# Patient Record
Sex: FEMALE | Race: WHITE | HISPANIC OR LATINO | URBAN - METROPOLITAN AREA
[De-identification: names, ages, dates, MRNs, and addresses within clinical notes are randomized per-mention and may not be internally consistent; named-entity substitution may affect disease eponyms.]

---

## 2017-07-07 ENCOUNTER — EMERGENCY (EMERGENCY)
Facility: HOSPITAL | Age: 42
LOS: 1 days | Discharge: HOME | End: 2017-07-07
Attending: EMERGENCY MEDICINE | Admitting: EMERGENCY MEDICINE
Payer: COMMERCIAL

## 2017-07-07 VITALS
OXYGEN SATURATION: 100 % | DIASTOLIC BLOOD PRESSURE: 104 MMHG | SYSTOLIC BLOOD PRESSURE: 140 MMHG | HEART RATE: 77 BPM | RESPIRATION RATE: 16 BRPM

## 2017-07-07 LAB
ALBUMIN SERPL ELPH-MCNC: 4.3 G/DL — SIGNIFICANT CHANGE UP (ref 3.3–5)
ALP SERPL-CCNC: 62 U/L — SIGNIFICANT CHANGE UP (ref 40–120)
ALT FLD-CCNC: 17 U/L RC — SIGNIFICANT CHANGE UP (ref 10–45)
ANION GAP SERPL CALC-SCNC: 13 MMOL/L — SIGNIFICANT CHANGE UP (ref 5–17)
AST SERPL-CCNC: 18 U/L — SIGNIFICANT CHANGE UP (ref 10–40)
BASOPHILS # BLD AUTO: 0 K/UL — SIGNIFICANT CHANGE UP (ref 0–0.2)
BASOPHILS NFR BLD AUTO: 0.6 % — SIGNIFICANT CHANGE UP (ref 0–2)
BILIRUB SERPL-MCNC: 0.4 MG/DL — SIGNIFICANT CHANGE UP (ref 0.2–1.2)
BUN SERPL-MCNC: 9 MG/DL — SIGNIFICANT CHANGE UP (ref 7–23)
CALCIUM SERPL-MCNC: 9 MG/DL — SIGNIFICANT CHANGE UP (ref 8.4–10.5)
CHLORIDE SERPL-SCNC: 104 MMOL/L — SIGNIFICANT CHANGE UP (ref 96–108)
CK MB BLD-MCNC: 1.3 % — SIGNIFICANT CHANGE UP (ref 0–3.5)
CK MB CFR SERPL CALC: 1.8 NG/ML — SIGNIFICANT CHANGE UP (ref 0–3.8)
CK SERPL-CCNC: 139 U/L — SIGNIFICANT CHANGE UP (ref 25–170)
CO2 SERPL-SCNC: 24 MMOL/L — SIGNIFICANT CHANGE UP (ref 22–31)
CREAT SERPL-MCNC: 0.65 MG/DL — SIGNIFICANT CHANGE UP (ref 0.5–1.3)
EOSINOPHIL # BLD AUTO: 0.1 K/UL — SIGNIFICANT CHANGE UP (ref 0–0.5)
EOSINOPHIL NFR BLD AUTO: 2.2 % — SIGNIFICANT CHANGE UP (ref 0–6)
GLUCOSE SERPL-MCNC: 80 MG/DL — SIGNIFICANT CHANGE UP (ref 70–99)
HCT VFR BLD CALC: 39.5 % — SIGNIFICANT CHANGE UP (ref 34.5–45)
HGB BLD-MCNC: 13.2 G/DL — SIGNIFICANT CHANGE UP (ref 11.5–15.5)
LYMPHOCYTES # BLD AUTO: 1.8 K/UL — SIGNIFICANT CHANGE UP (ref 1–3.3)
LYMPHOCYTES # BLD AUTO: 27.4 % — SIGNIFICANT CHANGE UP (ref 13–44)
MCHC RBC-ENTMCNC: 32.5 PG — SIGNIFICANT CHANGE UP (ref 27–34)
MCHC RBC-ENTMCNC: 33.4 GM/DL — SIGNIFICANT CHANGE UP (ref 32–36)
MCV RBC AUTO: 97.3 FL — SIGNIFICANT CHANGE UP (ref 80–100)
MONOCYTES # BLD AUTO: 0.5 K/UL — SIGNIFICANT CHANGE UP (ref 0–0.9)
MONOCYTES NFR BLD AUTO: 7.9 % — SIGNIFICANT CHANGE UP (ref 2–14)
NEUTROPHILS # BLD AUTO: 4 K/UL — SIGNIFICANT CHANGE UP (ref 1.8–7.4)
NEUTROPHILS NFR BLD AUTO: 61.8 % — SIGNIFICANT CHANGE UP (ref 43–77)
PLATELET # BLD AUTO: 171 K/UL — SIGNIFICANT CHANGE UP (ref 150–400)
POTASSIUM SERPL-MCNC: 3.4 MMOL/L — LOW (ref 3.5–5.3)
POTASSIUM SERPL-SCNC: 3.4 MMOL/L — LOW (ref 3.5–5.3)
PROT SERPL-MCNC: 7.2 G/DL — SIGNIFICANT CHANGE UP (ref 6–8.3)
RBC # BLD: 4.06 M/UL — SIGNIFICANT CHANGE UP (ref 3.8–5.2)
RBC # FLD: 12.1 % — SIGNIFICANT CHANGE UP (ref 10.3–14.5)
SODIUM SERPL-SCNC: 141 MMOL/L — SIGNIFICANT CHANGE UP (ref 135–145)
TROPONIN T SERPL-MCNC: <0.01 NG/ML — SIGNIFICANT CHANGE UP (ref 0–0.06)
WBC # BLD: 6.5 K/UL — SIGNIFICANT CHANGE UP (ref 3.8–10.5)
WBC # FLD AUTO: 6.5 K/UL — SIGNIFICANT CHANGE UP (ref 3.8–10.5)

## 2017-07-07 PROCEDURE — 71020: CPT | Mod: 26

## 2017-07-07 PROCEDURE — 93010 ELECTROCARDIOGRAM REPORT: CPT

## 2017-07-07 PROCEDURE — 99285 EMERGENCY DEPT VISIT HI MDM: CPT | Mod: 25

## 2017-07-07 RX ORDER — ASPIRIN/CALCIUM CARB/MAGNESIUM 324 MG
325 TABLET ORAL ONCE
Qty: 0 | Refills: 0 | Status: COMPLETED | OUTPATIENT
Start: 2017-07-07 | End: 2017-07-07

## 2017-07-07 RX ORDER — AMLODIPINE BESYLATE 2.5 MG/1
0 TABLET ORAL
Qty: 0 | Refills: 0 | COMMUNITY

## 2017-07-07 RX ORDER — ASPIRIN/CALCIUM CARB/MAGNESIUM 325 MG
325 TABLET ORAL ONCE
Qty: 0 | Refills: 0 | Status: DISCONTINUED | OUTPATIENT
Start: 2017-07-07 | End: 2017-07-07

## 2017-07-07 RX ORDER — POTASSIUM CHLORIDE 20 MEQ
40 PACKET (EA) ORAL ONCE
Qty: 0 | Refills: 0 | Status: COMPLETED | OUTPATIENT
Start: 2017-07-07 | End: 2017-07-07

## 2017-07-07 RX ADMIN — Medication 40 MILLIEQUIVALENT(S): at 22:44

## 2017-07-07 RX ADMIN — Medication 325 MILLIGRAM(S): at 21:04

## 2017-07-07 NOTE — ED PROVIDER NOTE - OBJECTIVE STATEMENT
41 year old female, HTN, hyperlipidemia, asthma, who presents to the ED for palpitations for 2 days. Reports palpitations intermittent, worse with laying flat. Reports she has chest pressure, substernal, non-radiating, 0/10, no aggravating or relieving factors. She reports she has nausea without vomiting. Reports no shortness of breath at rest but dyspnea on exertion. Went to primary medical doctor yesterday was first diagnosed with HTN and started on amlodipine. 41 year old female, HTN, hyperlipidemia, asthma, who presents to the ED for palpitations for 2 days. Reports palpitations intermittent, worse with laying flat. Reports she has chest pressure, substernal, non-radiating, 0/10, no aggravating or relieving factors. She reports she has nausea without vomiting. Also reports pain under left rib. Reports no shortness of breath at rest but dyspnea on exertion. Went to primary medical doctor yesterday was first diagnosed with HTN and started on amlodipine. Had EKG but does not know results . No recent fevers, chills. No excessive caffeine. No drug use. Former smoker. no hormaonal meds. Daughter sick with coxsackie. No rashes for patient. No recent travel. No recent hospitalizations or surgeries.

## 2017-07-07 NOTE — ED PROVIDER NOTE - MEDICAL DECISION MAKING DETAILS
41 year old female, HTN, hyperlipidemia, asthma, who presents to the ED for palpitations for 2 days. Will evaluate for cardiac/electrolyte causes and reeval. Keep on cardiac monitor. 1-2 risk factors for ACS

## 2017-07-07 NOTE — ED PROVIDER NOTE - ATTENDING CONTRIBUTION TO CARE
I have seen and evaluated this patient with the resident.   I agree with the findings  unless other wise stated.  I have made appropriate changes in documentations where needed, After my face to face bedside evaluation, I am further  [age] year old female, HTN, hyperlipidemia, asthma, who presents to the ED for palpitations for 2 days. Will evaluate for cardiac/electrolyte causes and reeval. Keep on cardiac monitor. 1-2 risk factors for ACS

## 2017-07-07 NOTE — ED PROVIDER NOTE - PLAN OF CARE
1) Take Tylenol 325mg tablet, take 2 tablets every 6-8 hours as needed for pain   2) Follow up with your cardiologist if you cannot follow up in 1-2 days call clinic at [phone] for appointment  3) You were given a copy of your results, please show them to your doctor for review.   4) Return to the ED for worsening palpitations, chest pain, abdominal pain, nausea, vomiting, dizziness, lightheadedness, or if you have any other new, worsening, or concerning symptoms.

## 2017-07-07 NOTE — ED ADULT NURSE NOTE - OBJECTIVE STATEMENT
42 y/o female hx high cholesterol presents to the ED c/o palpitations x 1 week. Pt states recent diagnosis of HTN, started on BP medications x 1 mth. Over past week noticed "not feeling right," BP was elevated at PMD- was advised to return to office for blood work, EKG normal yesterday. Endorsing intermittent SOB, nausea, pounding in chest radiating to jaw, headache. Denies blurred vision, diaphoresis, vomiting, fever, chills, echo/stress test. A&Ox3, in no respiratory distress, CM applied, EKG completed, safety maintained with comfort measures applied.

## 2017-07-07 NOTE — ED PROVIDER NOTE - PROGRESS NOTE DETAILS
delta trop negative, patient will need follow up with cardiology in 1-2 days, likely needs holter monitor. patient feels improved. will d/c home. discussed results, strict return precautions ambulatory, comfortable, stable for discharge. Jake Jason MD (attending) patient also given rapid follow up form.

## 2017-07-07 NOTE — ED PROVIDER NOTE - CARE PLAN
Principal Discharge DX:	Palpitations  Instructions for follow-up, activity and diet:	1) Take Tylenol 325mg tablet, take 2 tablets every 6-8 hours as needed for pain   2) Follow up with your cardiologist if you cannot follow up in 1-2 days call clinic at [phone] for appointment  3) You were given a copy of your results, please show them to your doctor for review.   4) Return to the ED for worsening palpitations, chest pain, abdominal pain, nausea, vomiting, dizziness, lightheadedness, or if you have any other new, worsening, or concerning symptoms.  Secondary Diagnosis:	Hypokalemia Principal Discharge DX:	Palpitations  Instructions for follow-up, activity and diet:	1) Take Tylenol 325mg tablet, take 2 tablets every 6-8 hours as needed for pain   2) Follow up with your cardiologist if you cannot follow up in 1-2 days call clinic at [phone] for appointment  3) You were given a copy of your results, please show them to your doctor for review.   4) Return to the ED for worsening palpitations, chest pain, abdominal pain, nausea, vomiting, dizziness, lightheadedness, or if you have any other new, worsening, or concerning symptoms.  Secondary Diagnosis:	Hypokalemia Principal Discharge DX:	Palpitations  Instructions for follow-up, activity and diet:	1) Take Tylenol 325mg tablet, take 2 tablets every 6-8 hours as needed for pain   2) Follow up with your cardiologist if you cannot follow up in 1-2 days call clinic at [phone] for appointment  3) You were given a copy of your results, please show them to your doctor for review.   4) Return to the ED for worsening palpitations, chest pain, abdominal pain, nausea, vomiting, dizziness, lightheadedness, or if you have any other new, worsening, or concerning symptoms.  Secondary Diagnosis:	Hypokalemia Principal Discharge DX:	Palpitations  Instructions for follow-up, activity and diet:	1) Take Tylenol 325mg tablet, take 2 tablets every 6-8 hours as needed for pain   2) Follow up with your cardiologist if you cannot follow up in 1-2 days call clinic at [phone] for appointment  3) You were given a copy of your results, please show them to your doctor for review.   4) Return to the ED for worsening palpitations, chest pain, abdominal pain, nausea, vomiting, dizziness, lightheadedness, or if you have any other new, worsening, or concerning symptoms.  Secondary Diagnosis:	Hypokalemia

## 2017-07-08 VITALS
OXYGEN SATURATION: 98 % | HEART RATE: 78 BPM | RESPIRATION RATE: 18 BRPM | TEMPERATURE: 98 F | SYSTOLIC BLOOD PRESSURE: 140 MMHG | DIASTOLIC BLOOD PRESSURE: 77 MMHG

## 2017-07-08 LAB
CK MB BLD-MCNC: 1.3 % — SIGNIFICANT CHANGE UP (ref 0–3.5)
CK MB CFR SERPL CALC: 1.7 NG/ML — SIGNIFICANT CHANGE UP (ref 0–3.8)
CK SERPL-CCNC: 131 U/L — SIGNIFICANT CHANGE UP (ref 25–170)
TROPONIN T SERPL-MCNC: <0.01 NG/ML — SIGNIFICANT CHANGE UP (ref 0–0.06)
TSH SERPL-MCNC: 0.66 UIU/ML — SIGNIFICANT CHANGE UP (ref 0.27–4.2)

## 2017-07-08 PROCEDURE — 71046 X-RAY EXAM CHEST 2 VIEWS: CPT

## 2017-07-08 PROCEDURE — 84443 ASSAY THYROID STIM HORMONE: CPT

## 2017-07-08 PROCEDURE — 80053 COMPREHEN METABOLIC PANEL: CPT

## 2017-07-08 PROCEDURE — 84484 ASSAY OF TROPONIN QUANT: CPT

## 2017-07-08 PROCEDURE — 82550 ASSAY OF CK (CPK): CPT

## 2017-07-08 PROCEDURE — 85027 COMPLETE CBC AUTOMATED: CPT

## 2017-07-08 PROCEDURE — 93005 ELECTROCARDIOGRAM TRACING: CPT

## 2017-07-08 PROCEDURE — 82553 CREATINE MB FRACTION: CPT

## 2017-07-08 PROCEDURE — 99283 EMERGENCY DEPT VISIT LOW MDM: CPT | Mod: 25

## 2017-07-13 ENCOUNTER — NON-APPOINTMENT (OUTPATIENT)
Age: 42
End: 2017-07-13

## 2017-07-13 ENCOUNTER — APPOINTMENT (OUTPATIENT)
Dept: CARDIOLOGY | Facility: CLINIC | Age: 42
End: 2017-07-13

## 2017-07-13 VITALS
HEART RATE: 91 BPM | WEIGHT: 148 LBS | DIASTOLIC BLOOD PRESSURE: 83 MMHG | SYSTOLIC BLOOD PRESSURE: 126 MMHG | BODY MASS INDEX: 25.9 KG/M2 | HEIGHT: 63.39 IN | OXYGEN SATURATION: 98 %

## 2017-07-13 DIAGNOSIS — Z87.891 PERSONAL HISTORY OF NICOTINE DEPENDENCE: ICD-10-CM

## 2017-07-13 DIAGNOSIS — I10 ESSENTIAL (PRIMARY) HYPERTENSION: ICD-10-CM

## 2017-07-13 DIAGNOSIS — Z83.49 FAMILY HISTORY OF OTHER ENDOCRINE, NUTRITIONAL AND METABOLIC DISEASES: ICD-10-CM

## 2017-07-13 DIAGNOSIS — Z82.49 FAMILY HISTORY OF ISCHEMIC HEART DISEASE AND OTHER DISEASES OF THE CIRCULATORY SYSTEM: ICD-10-CM

## 2017-07-13 DIAGNOSIS — R00.2 PALPITATIONS: ICD-10-CM

## 2017-07-13 DIAGNOSIS — Z87.898 PERSONAL HISTORY OF OTHER SPECIFIED CONDITIONS: ICD-10-CM

## 2017-07-13 DIAGNOSIS — E78.5 HYPERLIPIDEMIA, UNSPECIFIED: ICD-10-CM

## 2017-07-13 DIAGNOSIS — Z86.19 PERSONAL HISTORY OF OTHER INFECTIOUS AND PARASITIC DISEASES: ICD-10-CM

## 2017-07-13 DIAGNOSIS — J45.909 UNSPECIFIED ASTHMA, UNCOMPLICATED: ICD-10-CM

## 2017-07-13 DIAGNOSIS — R06.02 SHORTNESS OF BREATH: ICD-10-CM

## 2017-07-19 ENCOUNTER — NON-APPOINTMENT (OUTPATIENT)
Age: 42
End: 2017-07-19

## 2017-07-28 ENCOUNTER — APPOINTMENT (OUTPATIENT)
Dept: CV DIAGNOSITCS | Facility: HOSPITAL | Age: 42
End: 2017-07-28

## 2017-07-31 ENCOUNTER — OUTPATIENT (OUTPATIENT)
Dept: OUTPATIENT SERVICES | Facility: HOSPITAL | Age: 42
LOS: 1 days | End: 2017-07-31
Payer: COMMERCIAL

## 2017-07-31 ENCOUNTER — APPOINTMENT (OUTPATIENT)
Dept: CV DIAGNOSITCS | Facility: HOSPITAL | Age: 42
End: 2017-07-31

## 2017-07-31 DIAGNOSIS — R00.2 PALPITATIONS: ICD-10-CM

## 2017-07-31 PROCEDURE — 93306 TTE W/DOPPLER COMPLETE: CPT | Mod: 26

## 2017-07-31 PROCEDURE — 93306 TTE W/DOPPLER COMPLETE: CPT

## 2017-08-10 ENCOUNTER — APPOINTMENT (OUTPATIENT)
Dept: CARDIOLOGY | Facility: CLINIC | Age: 42
End: 2017-08-10
Payer: COMMERCIAL

## 2017-08-10 VITALS — SYSTOLIC BLOOD PRESSURE: 114 MMHG | HEART RATE: 73 BPM | DIASTOLIC BLOOD PRESSURE: 78 MMHG

## 2017-08-10 PROCEDURE — 99215 OFFICE O/P EST HI 40 MIN: CPT

## 2017-08-14 RX ORDER — ALBUTEROL SULFATE 90 UG/1
108 (90 BASE) AEROSOL, METERED RESPIRATORY (INHALATION)
Qty: 8 | Refills: 0 | Status: ACTIVE | COMMUNITY
Start: 2017-03-17

## 2017-08-14 RX ORDER — AZITHROMYCIN 250 MG/1
250 TABLET, FILM COATED ORAL
Qty: 6 | Refills: 0 | Status: COMPLETED | COMMUNITY
Start: 2017-03-17

## 2017-08-14 RX ORDER — AMLODIPINE BESYLATE 5 MG/1
5 TABLET ORAL
Qty: 30 | Refills: 0 | Status: ACTIVE | COMMUNITY
Start: 2017-07-06

## 2017-08-14 RX ORDER — FLUCONAZOLE 150 MG/1
150 TABLET ORAL
Qty: 1 | Refills: 0 | Status: COMPLETED | COMMUNITY
Start: 2017-03-18

## 2017-08-14 RX ORDER — METHYLPREDNISOLONE 4 MG/1
4 TABLET ORAL
Qty: 21 | Refills: 0 | Status: COMPLETED | COMMUNITY
Start: 2017-03-18

## 2018-02-13 ENCOUNTER — APPOINTMENT (OUTPATIENT)
Dept: SURGERY | Facility: CLINIC | Age: 43
End: 2018-02-13

## 2019-12-13 ENCOUNTER — EMERGENCY (EMERGENCY)
Facility: HOSPITAL | Age: 44
LOS: 1 days | Discharge: HOME | End: 2019-12-13
Attending: STUDENT IN AN ORGANIZED HEALTH CARE EDUCATION/TRAINING PROGRAM
Payer: COMMERCIAL

## 2019-12-13 VITALS
HEART RATE: 88 BPM | HEIGHT: 64.5 IN | DIASTOLIC BLOOD PRESSURE: 86 MMHG | OXYGEN SATURATION: 100 % | TEMPERATURE: 98 F | SYSTOLIC BLOOD PRESSURE: 149 MMHG | RESPIRATION RATE: 18 BRPM | WEIGHT: 160.06 LBS

## 2019-12-13 VITALS
DIASTOLIC BLOOD PRESSURE: 93 MMHG | OXYGEN SATURATION: 100 % | TEMPERATURE: 98 F | SYSTOLIC BLOOD PRESSURE: 132 MMHG | RESPIRATION RATE: 18 BRPM | HEART RATE: 68 BPM

## 2019-12-13 PROBLEM — I10 ESSENTIAL (PRIMARY) HYPERTENSION: Chronic | Status: ACTIVE | Noted: 2017-07-07

## 2019-12-13 LAB
ALBUMIN SERPL ELPH-MCNC: 4.5 G/DL — SIGNIFICANT CHANGE UP (ref 3.3–5)
ALP SERPL-CCNC: 83 U/L — SIGNIFICANT CHANGE UP (ref 40–120)
ALT FLD-CCNC: 22 U/L — SIGNIFICANT CHANGE UP (ref 10–45)
ANION GAP SERPL CALC-SCNC: 10 MMOL/L — SIGNIFICANT CHANGE UP (ref 5–17)
AST SERPL-CCNC: 21 U/L — SIGNIFICANT CHANGE UP (ref 10–40)
BASOPHILS # BLD AUTO: 0.07 K/UL — SIGNIFICANT CHANGE UP (ref 0–0.2)
BASOPHILS NFR BLD AUTO: 1 % — SIGNIFICANT CHANGE UP (ref 0–2)
BILIRUB SERPL-MCNC: 0.2 MG/DL — SIGNIFICANT CHANGE UP (ref 0.2–1.2)
BUN SERPL-MCNC: 19 MG/DL — SIGNIFICANT CHANGE UP (ref 7–23)
CALCIUM SERPL-MCNC: 8.8 MG/DL — SIGNIFICANT CHANGE UP (ref 8.4–10.5)
CHLORIDE SERPL-SCNC: 103 MMOL/L — SIGNIFICANT CHANGE UP (ref 96–108)
CO2 SERPL-SCNC: 25 MMOL/L — SIGNIFICANT CHANGE UP (ref 22–31)
CREAT SERPL-MCNC: 0.67 MG/DL — SIGNIFICANT CHANGE UP (ref 0.5–1.3)
EOSINOPHIL # BLD AUTO: 0.36 K/UL — SIGNIFICANT CHANGE UP (ref 0–0.5)
EOSINOPHIL NFR BLD AUTO: 5 % — SIGNIFICANT CHANGE UP (ref 0–6)
GLUCOSE SERPL-MCNC: 133 MG/DL — HIGH (ref 70–99)
HCT VFR BLD CALC: 40.7 % — SIGNIFICANT CHANGE UP (ref 34.5–45)
HGB BLD-MCNC: 13.4 G/DL — SIGNIFICANT CHANGE UP (ref 11.5–15.5)
IMM GRANULOCYTES NFR BLD AUTO: 0.3 % — SIGNIFICANT CHANGE UP (ref 0–1.5)
LYMPHOCYTES # BLD AUTO: 2.45 K/UL — SIGNIFICANT CHANGE UP (ref 1–3.3)
LYMPHOCYTES # BLD AUTO: 33.7 % — SIGNIFICANT CHANGE UP (ref 13–44)
MCHC RBC-ENTMCNC: 31.3 PG — SIGNIFICANT CHANGE UP (ref 27–34)
MCHC RBC-ENTMCNC: 32.9 GM/DL — SIGNIFICANT CHANGE UP (ref 32–36)
MCV RBC AUTO: 95.1 FL — SIGNIFICANT CHANGE UP (ref 80–100)
MONOCYTES # BLD AUTO: 0.63 K/UL — SIGNIFICANT CHANGE UP (ref 0–0.9)
MONOCYTES NFR BLD AUTO: 8.7 % — SIGNIFICANT CHANGE UP (ref 2–14)
NEUTROPHILS # BLD AUTO: 3.74 K/UL — SIGNIFICANT CHANGE UP (ref 1.8–7.4)
NEUTROPHILS NFR BLD AUTO: 51.3 % — SIGNIFICANT CHANGE UP (ref 43–77)
NRBC # BLD: 0 /100 WBCS — SIGNIFICANT CHANGE UP (ref 0–0)
PLATELET # BLD AUTO: 252 K/UL — SIGNIFICANT CHANGE UP (ref 150–400)
POTASSIUM SERPL-MCNC: 3.5 MMOL/L — SIGNIFICANT CHANGE UP (ref 3.5–5.3)
POTASSIUM SERPL-SCNC: 3.5 MMOL/L — SIGNIFICANT CHANGE UP (ref 3.5–5.3)
PROT SERPL-MCNC: 7.4 G/DL — SIGNIFICANT CHANGE UP (ref 6–8.3)
RBC # BLD: 4.28 M/UL — SIGNIFICANT CHANGE UP (ref 3.8–5.2)
RBC # FLD: 13.3 % — SIGNIFICANT CHANGE UP (ref 10.3–14.5)
SODIUM SERPL-SCNC: 138 MMOL/L — SIGNIFICANT CHANGE UP (ref 135–145)
WBC # BLD: 7.27 K/UL — SIGNIFICANT CHANGE UP (ref 3.8–10.5)
WBC # FLD AUTO: 7.27 K/UL — SIGNIFICANT CHANGE UP (ref 3.8–10.5)

## 2019-12-13 PROCEDURE — 99284 EMERGENCY DEPT VISIT MOD MDM: CPT

## 2019-12-13 PROCEDURE — 70450 CT HEAD/BRAIN W/O DYE: CPT | Mod: 26

## 2019-12-13 PROCEDURE — 80053 COMPREHEN METABOLIC PANEL: CPT

## 2019-12-13 PROCEDURE — 85027 COMPLETE CBC AUTOMATED: CPT

## 2019-12-13 PROCEDURE — 96374 THER/PROPH/DIAG INJ IV PUSH: CPT

## 2019-12-13 PROCEDURE — 99284 EMERGENCY DEPT VISIT MOD MDM: CPT | Mod: 25

## 2019-12-13 PROCEDURE — 70450 CT HEAD/BRAIN W/O DYE: CPT

## 2019-12-13 RX ORDER — METOCLOPRAMIDE HCL 10 MG
10 TABLET ORAL ONCE
Refills: 0 | Status: COMPLETED | OUTPATIENT
Start: 2019-12-13 | End: 2019-12-13

## 2019-12-13 RX ORDER — SODIUM CHLORIDE 9 MG/ML
500 INJECTION INTRAMUSCULAR; INTRAVENOUS; SUBCUTANEOUS ONCE
Refills: 0 | Status: COMPLETED | OUTPATIENT
Start: 2019-12-13 | End: 2019-12-13

## 2019-12-13 RX ORDER — SODIUM CHLORIDE 9 MG/ML
500 INJECTION INTRAMUSCULAR; INTRAVENOUS; SUBCUTANEOUS ONCE
Refills: 0 | Status: DISCONTINUED | OUTPATIENT
Start: 2019-12-13 | End: 2019-12-17

## 2019-12-13 RX ORDER — ACETAMINOPHEN 500 MG
975 TABLET ORAL ONCE
Refills: 0 | Status: COMPLETED | OUTPATIENT
Start: 2019-12-13 | End: 2019-12-13

## 2019-12-13 RX ADMIN — SODIUM CHLORIDE 1200 MILLILITER(S): 9 INJECTION INTRAMUSCULAR; INTRAVENOUS; SUBCUTANEOUS at 22:18

## 2019-12-13 RX ADMIN — Medication 10 MILLIGRAM(S): at 22:16

## 2019-12-13 RX ADMIN — Medication 975 MILLIGRAM(S): at 22:16

## 2019-12-13 NOTE — ED PROVIDER NOTE - ATTENDING CONTRIBUTION TO CARE
44 F w/ hx of noncompliance (previously diagnosed HTN), hx of pituitary adenoma (monitored by MRI) presents to the ED w/ worsening headache and reports intermittent blackouts of vision, followed by primary care doctor. 44 F w/ hx of noncompliance (previously diagnosed HTN), hx of pituitary adenoma (monitored by MRI) presents to the ED w/ worsening headache and reports intermittent blackouts of vision, and double vision that is intermittent. Pt recently started seeing her PCP who recommended that the patient start her antihypertensive.   no neuro deficit currently. Neuro: 5/5 bilateral elbow flexion/extension, 5/5 bilateral wrist flexion/extension, 5/5 bilateral hand , 5/5 bilateral hip flexion/extension, 5/5 bilateral knee flexion/extension, 5/5 bilateral dorsiflexion/plantarflexion, intact sensation in the bilateral arms and legs to light touch, normal finger to nose bilaterally w/ no dysmetria, 2+ reflexes in lower extremities, EOMI, CN2-12 intact, pupils are 4 mm and reactive bilaterally  pt is concerned regarding elevated bp  unclear eitiology of symptoms, pt to have MRI on monday, will treat w/ migraine cocktail  pt had significant improvement in symptoms  unlikely SAH- not sudden onset nor worst headache of life   pt just reports persistent head pain that has been gradually worsening. +sinus pressure  likely sinusitis vs migraine headache +photophobia, phonophobia and mild nausea w/ no vomiting.

## 2019-12-13 NOTE — ED PROVIDER NOTE - CLINICAL SUMMARY MEDICAL DECISION MAKING FREE TEXT BOX
44F hx pituitary tumor s/p resection with neg MRI 2 yrs ago, htn non compliant with amlodipine for a year presents with 3 days of gradual onset mild to moderate throbbing HA, photophobia, phonophobia, and feeling faint with High BP. Highest BP has been sys 150. Pt was able to book an appt for MR Head for Monday. BP here  132/81 neuro exam non focal, ambulating without ataxic gait. Treat migraine like HA, pt now  taking her Amlodipine at

## 2019-12-13 NOTE — ED PROVIDER NOTE - OBJECTIVE STATEMENT
44F hx pituitary tumor s/p resection with neg MRI 2 yrs ago, htn non compliant with amlodipine for a year presents with 3 days of gradual onset mild to moderate throbbing HA, feeling faint with High BP. Highest BP has been sys 150. +blurry vision, photophobia and phonophobia, also worsened by stress at work (teacher). Patient denies chest pain, any facial droop, slurred speech, difficulty speaking or word finding, difficulty swallowing, focal weakness/numbess/tingling, LOC, difficulty walking

## 2019-12-13 NOTE — ED PROVIDER NOTE - NSFOLLOWUPINSTRUCTIONS_ED_ALL_ED_FT
1) You were seen in the ER headache. The patient has been informed of all concerning signs and symptoms to return to Emergency Department, the necessity to follow up with PMD/Clinic/follow up provided within 2-3 days was explained, and the patient reports understanding of above with capacity and insight. You can look at the discharge papers for some examples of specific signs and symptoms to look out for.  2) Please get your MRI on Monday.   3) Please take Tylenol 650mg every 4-6 hours as needed for pain.  4) Please return to ED for any new or worsening symptoms.

## 2019-12-13 NOTE — ED PROVIDER NOTE - CARE PLAN
Principal Discharge DX:	HA (headache)  Secondary Diagnosis:	Photophobia of both eyes  Secondary Diagnosis:	Blurry vision, bilateral

## 2019-12-13 NOTE — ED PROVIDER NOTE - PHYSICAL EXAMINATION
GEN: Well appearing, well nourished, in no apparent distress.  HEAD: NCAT  HEENT: PERRL, EOMI, no nystagmus, no facial droop, Airway patent, uvula midline, MMM, neck supple, no LAD, no JVD, no raccoon sign, no zaman sign, no Lutheran TTP  LUNG: CTAB, no adventitious sounds, no retractions, no nasal flaring  CV: RRR, no murmurs,   Abd: soft, NTND, no rebound or guarding, BS+ in all quadrants, no CVAT  MSK: WWP, Pulses 2+ in extremities, No edema, no visible deformities, strength 5/5 in all extremities, FROM, no midspine TTP  Neuro:  CN II-XII in tact. AAOx3, Ambulatory with stable gait. sensations in tact in all extremities, neg pronator drift, neg finger to nose,  neg romberg  Skin: Warm and dry, no evidence of rash  Psych: normal mood and affect

## 2019-12-13 NOTE — ED PROVIDER NOTE - PATIENT PORTAL LINK FT
You can access the FollowMyHealth Patient Portal offered by [facility] by registering at the following website: [email or web address]. By joining [facility]’s FollowMyHealth portal, you will also be able to view your health information using other applications (apps) compatible with our system.

## 2019-12-14 NOTE — ED ADULT NURSE NOTE - NSIMPLEMENTINTERV_GEN_ALL_ED
Implemented All Universal Safety Interventions:  [place] to call system. Call bell, personal items and telephone within reach. Instruct patient to call for assistance. Room bathroom lighting operational. Non-slip footwear when patient is off stretcher. Physically safe environment: no spills, clutter or unnecessary equipment. Stretcher in lowest position, wheels locked, appropriate side rails in place.

## 2019-12-14 NOTE — ED ADULT NURSE NOTE - OBJECTIVE STATEMENT
Patient is a 44 year old female complaining of headache. Arrived by walk-in. Patient has history of htn. Patient is A&O x4. Pt reports headache for a few days and feeling like she would pass out at work. Denies complaints of chest pain, sob, fevers, chills, n/v/d, burning urination, blood in urine, blood in stool. Abd is soft, non tender, non distended. Skin is warm and dry. Color is consistent with ethnicity. VSS/ NAD. Safety and comfort maintained. family at the bedside. Will continue to monitor.

## 2021-08-03 NOTE — ED ADULT NURSE NOTE - CAS ELECT INFOMATION PROVIDED
Patient calling to state she is almost out of these medications.  Please advise  [phone]   dr. wheatley/LINNETTE cruz

## 2021-11-24 PROBLEM — D49.7 NEOPLASM OF UNSPECIFIED BEHAVIOR OF ENDOCRINE GLANDS AND OTHER PARTS OF NERVOUS SYSTEM: Chronic | Status: ACTIVE | Noted: 2019-12-13

## 2021-12-28 ENCOUNTER — APPOINTMENT (OUTPATIENT)
Dept: GASTROENTEROLOGY | Facility: CLINIC | Age: 46
End: 2021-12-28
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
## 2022-11-17 NOTE — ED PROVIDER NOTE - CROS ED EYES ALL NEG
ID    Chart reviewed. Path pending. I will change to doxycycline plus cipro and plan to complete 7 days of antibiotics. I will sign off.     Amilcar Burnett MD - - -

## 2023-03-21 NOTE — ED ADULT NURSE NOTE - RESPIRATORY WDL
Documented in another telephone call.    Breathing spontaneous and unlabored. Breath sounds clear and equal bilaterally with regular rhythm.

## 2023-05-20 ENCOUNTER — EMERGENCY (EMERGENCY)
Facility: HOSPITAL | Age: 48
LOS: 1 days | Discharge: HOME | End: 2023-05-20
Attending: EMERGENCY MEDICINE
Payer: COMMERCIAL

## 2023-05-20 VITALS
WEIGHT: 169.98 LBS | TEMPERATURE: 98 F | HEIGHT: 64 IN | DIASTOLIC BLOOD PRESSURE: 70 MMHG | HEART RATE: 121 BPM | SYSTOLIC BLOOD PRESSURE: 108 MMHG | OXYGEN SATURATION: 99 % | RESPIRATION RATE: 18 BRPM

## 2023-05-20 LAB
ALBUMIN SERPL ELPH-MCNC: 4.3 G/DL — SIGNIFICANT CHANGE UP (ref 3.3–5)
ALP SERPL-CCNC: 95 U/L — SIGNIFICANT CHANGE UP (ref 40–120)
ALT FLD-CCNC: 32 U/L — SIGNIFICANT CHANGE UP (ref 10–45)
ANION GAP SERPL CALC-SCNC: 17 MMOL/L — SIGNIFICANT CHANGE UP (ref 5–17)
APPEARANCE UR: ABNORMAL
AST SERPL-CCNC: 28 U/L — SIGNIFICANT CHANGE UP (ref 10–40)
BACTERIA # UR AUTO: ABNORMAL
BASOPHILS # BLD AUTO: 0 K/UL — SIGNIFICANT CHANGE UP (ref 0–0.2)
BASOPHILS NFR BLD AUTO: 0 % — SIGNIFICANT CHANGE UP (ref 0–2)
BILIRUB DIRECT SERPL-MCNC: 0.1 MG/DL — SIGNIFICANT CHANGE UP (ref 0–0.3)
BILIRUB INDIRECT FLD-MCNC: 0.4 MG/DL — SIGNIFICANT CHANGE UP (ref 0.2–1)
BILIRUB SERPL-MCNC: 0.5 MG/DL — SIGNIFICANT CHANGE UP (ref 0.2–1.2)
BILIRUB UR-MCNC: NEGATIVE — SIGNIFICANT CHANGE UP
BUN SERPL-MCNC: 19 MG/DL — SIGNIFICANT CHANGE UP (ref 7–23)
CALCIUM SERPL-MCNC: 8.8 MG/DL — SIGNIFICANT CHANGE UP (ref 8.4–10.5)
CHLORIDE SERPL-SCNC: 104 MMOL/L — SIGNIFICANT CHANGE UP (ref 96–108)
CO2 SERPL-SCNC: 20 MMOL/L — LOW (ref 22–31)
COLOR SPEC: YELLOW — SIGNIFICANT CHANGE UP
CREAT SERPL-MCNC: 0.82 MG/DL — SIGNIFICANT CHANGE UP (ref 0.5–1.3)
DIFF PNL FLD: NEGATIVE — SIGNIFICANT CHANGE UP
EGFR: 88 ML/MIN/1.73M2 — SIGNIFICANT CHANGE UP
EOSINOPHIL # BLD AUTO: 0.08 K/UL — SIGNIFICANT CHANGE UP (ref 0–0.5)
EOSINOPHIL NFR BLD AUTO: 0.9 % — SIGNIFICANT CHANGE UP (ref 0–6)
EPI CELLS # UR: 15 /HPF — HIGH
GLUCOSE SERPL-MCNC: 124 MG/DL — HIGH (ref 70–99)
GLUCOSE UR QL: NEGATIVE — SIGNIFICANT CHANGE UP
HCG SERPL-ACNC: <2 MIU/ML — SIGNIFICANT CHANGE UP
HCT VFR BLD CALC: 43.1 % — SIGNIFICANT CHANGE UP (ref 34.5–45)
HGB BLD-MCNC: 14 G/DL — SIGNIFICANT CHANGE UP (ref 11.5–15.5)
HYALINE CASTS # UR AUTO: 2 /LPF — SIGNIFICANT CHANGE UP (ref 0–7)
KETONES UR-MCNC: NEGATIVE — SIGNIFICANT CHANGE UP
LEUKOCYTE ESTERASE UR-ACNC: NEGATIVE — SIGNIFICANT CHANGE UP
LIDOCAIN IGE QN: 23 U/L — SIGNIFICANT CHANGE UP (ref 7–60)
LYMPHOCYTES # BLD AUTO: 0.36 K/UL — LOW (ref 1–3.3)
LYMPHOCYTES # BLD AUTO: 4.3 % — LOW (ref 13–44)
MANUAL SMEAR VERIFICATION: SIGNIFICANT CHANGE UP
MCHC RBC-ENTMCNC: 30 PG — SIGNIFICANT CHANGE UP (ref 27–34)
MCHC RBC-ENTMCNC: 32.5 GM/DL — SIGNIFICANT CHANGE UP (ref 32–36)
MCV RBC AUTO: 92.5 FL — SIGNIFICANT CHANGE UP (ref 80–100)
MONOCYTES # BLD AUTO: 0.43 K/UL — SIGNIFICANT CHANGE UP (ref 0–0.9)
MONOCYTES NFR BLD AUTO: 5.2 % — SIGNIFICANT CHANGE UP (ref 2–14)
NEUTROPHILS # BLD AUTO: 7.49 K/UL — HIGH (ref 1.8–7.4)
NEUTROPHILS NFR BLD AUTO: 89.6 % — HIGH (ref 43–77)
NITRITE UR-MCNC: NEGATIVE — SIGNIFICANT CHANGE UP
PH UR: 6 — SIGNIFICANT CHANGE UP (ref 5–8)
PLAT MORPH BLD: NORMAL — SIGNIFICANT CHANGE UP
PLATELET # BLD AUTO: 202 K/UL — SIGNIFICANT CHANGE UP (ref 150–400)
POTASSIUM SERPL-MCNC: 3.2 MMOL/L — LOW (ref 3.5–5.3)
POTASSIUM SERPL-SCNC: 3.2 MMOL/L — LOW (ref 3.5–5.3)
PROT SERPL-MCNC: 7.2 G/DL — SIGNIFICANT CHANGE UP (ref 6–8.3)
PROT UR-MCNC: ABNORMAL
RBC # BLD: 4.66 M/UL — SIGNIFICANT CHANGE UP (ref 3.8–5.2)
RBC # FLD: 13.8 % — SIGNIFICANT CHANGE UP (ref 10.3–14.5)
RBC BLD AUTO: NORMAL — SIGNIFICANT CHANGE UP
RBC CASTS # UR COMP ASSIST: 7 /HPF — HIGH (ref 0–4)
SODIUM SERPL-SCNC: 141 MMOL/L — SIGNIFICANT CHANGE UP (ref 135–145)
SP GR SPEC: 1.03 — HIGH (ref 1.01–1.02)
UROBILINOGEN FLD QL: NEGATIVE — SIGNIFICANT CHANGE UP
WBC # BLD: 8.36 K/UL — SIGNIFICANT CHANGE UP (ref 3.8–10.5)
WBC # FLD AUTO: 8.36 K/UL — SIGNIFICANT CHANGE UP (ref 3.8–10.5)
WBC UR QL: 2 /HPF — SIGNIFICANT CHANGE UP (ref 0–5)

## 2023-05-20 PROCEDURE — 99285 EMERGENCY DEPT VISIT HI MDM: CPT

## 2023-05-20 PROCEDURE — 71045 X-RAY EXAM CHEST 1 VIEW: CPT | Mod: 26

## 2023-05-20 RX ORDER — ONDANSETRON 8 MG/1
4 TABLET, FILM COATED ORAL ONCE
Refills: 0 | Status: COMPLETED | OUTPATIENT
Start: 2023-05-20 | End: 2023-05-20

## 2023-05-20 RX ORDER — ACETAMINOPHEN 500 MG
1000 TABLET ORAL ONCE
Refills: 0 | Status: COMPLETED | OUTPATIENT
Start: 2023-05-20 | End: 2023-05-20

## 2023-05-20 RX ORDER — SODIUM CHLORIDE 9 MG/ML
1000 INJECTION INTRAMUSCULAR; INTRAVENOUS; SUBCUTANEOUS ONCE
Refills: 0 | Status: COMPLETED | OUTPATIENT
Start: 2023-05-20 | End: 2023-05-20

## 2023-05-20 RX ADMIN — Medication 1000 MILLIGRAM(S): at 22:59

## 2023-05-20 RX ADMIN — ONDANSETRON 4 MILLIGRAM(S): 8 TABLET, FILM COATED ORAL at 22:25

## 2023-05-20 RX ADMIN — Medication 400 MILLIGRAM(S): at 22:29

## 2023-05-20 RX ADMIN — SODIUM CHLORIDE 1000 MILLILITER(S): 9 INJECTION INTRAMUSCULAR; INTRAVENOUS; SUBCUTANEOUS at 22:29

## 2023-05-20 NOTE — ED PROVIDER NOTE - PHYSICAL EXAMINATION
PHYSICAL EXAM:  GEN: NAD   HEAD: Atraumatic  CARDIAC: tachycardia no appreciable murmur   RESPIRATORY: Breathing unlabored. Breath sounds clear and equal bilaterally.  ABDOMEN:  Soft, nondistended. epigastric and RUQ tenderness without rebound or guarding  NEUROLOGICAL: Alert and oriented, grossly nonfocal   SKIN: Skin warm and dry. No evidence of rashes or lesions.

## 2023-05-20 NOTE — ED ADULT NURSE NOTE - NSFALLUNIVINTERV_ED_ALL_ED
Bed/Stretcher in lowest position, wheels locked, appropriate side rails in place/Call bell, personal items and telephone in reach/Instruct patient to call for assistance before getting out of bed/chair/stretcher/Non-slip footwear applied when patient is off stretcher/[place] to call system/Physically safe environment - no spills, clutter or unnecessary equipment/Purposeful proactive rounding/Room/bathroom lighting operational, light cord in reach

## 2023-05-20 NOTE — ED ADULT NURSE NOTE - OBJECTIVE STATEMENT
Patient is a 46 y/o female presenting to the ED c/o abdominal pain. Pt reports acute onset of RUQ and epigastric pain after waking up this morning with associated nausea, chills, fatigue and subjective fever. Pt reports pain worsening after eating, denies vomiting. Pt is A&OX3, following commands, speaks coherently, sensory/motor function intact. Pt tachycardic denies chest pain, SOB, palpitations. Pt also denies back pain, dysuria, constipation, diarrhea.

## 2023-05-20 NOTE — ED ADULT NURSE NOTE - CHIEF COMPLAINT QUOTE
abdominal pain began this morning; went to urgent care; sent to Ed for further eval; pt reports chills/nausea/subjective fever.

## 2023-05-20 NOTE — ED PROVIDER NOTE - NSFOLLOWUPINSTRUCTIONS_ED_ALL_ED_FT
Your evaluated in the emergency department with abdominal pain.  An ultrasound was performed which showed no issues with the gallbladder or liver.  A CAT scan was performed which showed no acute pathology in the abdomen    Your urinalysis revealed a urinary tract infection–prescription has been sent to your pharmacy this medication must be taken 4 times per day for 5 days.    Please return to the emergency department if you have any new or worsening symptoms    Follow-up with your primary care physician.

## 2023-05-20 NOTE — ED PROVIDER NOTE - PROGRESS NOTE DETAILS
Wale- results of ct discussed with patient. Pt noting improved pain. UA with uti. Will dc with keflex

## 2023-05-20 NOTE — ED PROVIDER NOTE - PATIENT PORTAL LINK FT
You can access the FollowMyHealth Patient Portal offered by [facility] by registering at the following website: [email or web address]. By joining [facility]’s FollowMyHealth portal, you will also be able to view your health information using other applications (apps) compatible with our system.

## 2023-05-20 NOTE — ED ADULT NURSE NOTE - CAS ELECT INFOMATION PROVIDED
pt instructed to follow up with outpatient PCP, return to ED with worsening s/s. Pt verbalizes understanding/DC instructions

## 2023-05-20 NOTE — ED PROVIDER NOTE - CLINICAL SUMMARY MEDICAL DECISION MAKING FREE TEXT BOX
47-year-old female presenting with sudden onset abdominal pain that began this morning. PMH pituitary adenoma s/p resection, asthma. VS with tachycardia. exam with epigastric and ruq tenderness. Plan to cbc cmp lipase ua bili ekg ruq u/s pain control, ivf, zofran

## 2023-05-20 NOTE — ED PROVIDER NOTE - OBJECTIVE STATEMENT
47-year-old female presenting with sudden onset abdominal pain that began this morning.  Patient describes pain as sharp and localized to the upper abdomen associated with nausea.  Patient was seen in urgent care earlier today and sent to the emergency department for further evaluation–had urine studies which showed elevated urine bilirubin, and also negative viral swab.  Patient also endorsing dysuria and chills. denies vomiting, diarrhea, hematochezia, hematuria. Denies hx of any abdominal surgery.

## 2023-05-20 NOTE — ED PROVIDER NOTE - ATTENDING CONTRIBUTION TO CARE
Pt with epigastric pain since this AM no assoc vomiting nor fever.     PE: well appearing, no jaundice, nontender abdomen.    MDM: check cbc r/o anemia or leukocytosis, check bmp to r/o metabolic derangement and lyte imbalance, LFTs, u/s abdomen r/o cholecystitis, GI meds. Pt with epigastric pain since this AM no assoc vomiting nor fever.     PE: well appearing, no jaundice, mild epigastric td abdomen.  No rebound.    MDM: check cbc r/o anemia or leukocytosis, check bmp to r/o metabolic derangement and lyte imbalance, LFTs, u/s abdomen r/o cholecystitis, GI meds.  xray chest r/o free air/perforation.  EKG, troponin r/o ACS.

## 2023-05-20 NOTE — ED ADULT TRIAGE NOTE - CHIEF COMPLAINT QUOTE
abdominal pain began this morning; went to urgent care; sent to Ed for further eval; pt reports chills/nausea. abdominal pain began this morning; went to urgent care; sent to Ed for further eval; pt reports chills/nausea/subjective fever.

## 2023-05-21 VITALS
OXYGEN SATURATION: 98 % | DIASTOLIC BLOOD PRESSURE: 71 MMHG | HEART RATE: 67 BPM | RESPIRATION RATE: 14 BRPM | TEMPERATURE: 99 F | SYSTOLIC BLOOD PRESSURE: 106 MMHG

## 2023-05-21 PROCEDURE — 99285 EMERGENCY DEPT VISIT HI MDM: CPT | Mod: 25

## 2023-05-21 PROCEDURE — 71045 X-RAY EXAM CHEST 1 VIEW: CPT

## 2023-05-21 PROCEDURE — 74177 CT ABD & PELVIS W/CONTRAST: CPT | Mod: MA

## 2023-05-21 PROCEDURE — 96375 TX/PRO/DX INJ NEW DRUG ADDON: CPT

## 2023-05-21 PROCEDURE — 80053 COMPREHEN METABOLIC PANEL: CPT

## 2023-05-21 PROCEDURE — 96374 THER/PROPH/DIAG INJ IV PUSH: CPT | Mod: XU

## 2023-05-21 PROCEDURE — 85025 COMPLETE CBC W/AUTO DIFF WBC: CPT

## 2023-05-21 PROCEDURE — 74177 CT ABD & PELVIS W/CONTRAST: CPT | Mod: 26,MA

## 2023-05-21 PROCEDURE — 84484 ASSAY OF TROPONIN QUANT: CPT

## 2023-05-21 PROCEDURE — 76705 ECHO EXAM OF ABDOMEN: CPT

## 2023-05-21 PROCEDURE — 81001 URINALYSIS AUTO W/SCOPE: CPT

## 2023-05-21 PROCEDURE — 76705 ECHO EXAM OF ABDOMEN: CPT | Mod: 26

## 2023-05-21 PROCEDURE — 84702 CHORIONIC GONADOTROPIN TEST: CPT

## 2023-05-21 PROCEDURE — 93005 ELECTROCARDIOGRAM TRACING: CPT

## 2023-05-21 PROCEDURE — 83690 ASSAY OF LIPASE: CPT

## 2023-05-21 PROCEDURE — 82248 BILIRUBIN DIRECT: CPT

## 2023-05-21 PROCEDURE — 87086 URINE CULTURE/COLONY COUNT: CPT

## 2023-05-21 RX ORDER — CEPHALEXIN 500 MG
1 CAPSULE ORAL
Qty: 20 | Refills: 0
Start: 2023-05-21 | End: 2023-05-25

## 2023-05-21 RX ORDER — ACETAMINOPHEN 500 MG
975 TABLET ORAL ONCE
Refills: 0 | Status: COMPLETED | OUTPATIENT
Start: 2023-05-21 | End: 2023-05-21

## 2023-05-21 RX ADMIN — Medication 975 MILLIGRAM(S): at 05:18

## 2023-05-22 LAB
CULTURE RESULTS: SIGNIFICANT CHANGE UP
SPECIMEN SOURCE: SIGNIFICANT CHANGE UP

## 2024-07-27 ENCOUNTER — EMERGENCY (EMERGENCY)
Facility: HOSPITAL | Age: 49
LOS: 1 days | Discharge: HOME | End: 2024-07-27
Attending: EMERGENCY MEDICINE
Payer: COMMERCIAL

## 2024-07-27 VITALS
SYSTOLIC BLOOD PRESSURE: 122 MMHG | DIASTOLIC BLOOD PRESSURE: 74 MMHG | OXYGEN SATURATION: 99 % | RESPIRATION RATE: 18 BRPM | HEART RATE: 62 BPM | TEMPERATURE: 98 F

## 2024-07-27 VITALS
HEART RATE: 68 BPM | WEIGHT: 179.9 LBS | SYSTOLIC BLOOD PRESSURE: 121 MMHG | RESPIRATION RATE: 20 BRPM | TEMPERATURE: 98 F | HEIGHT: 64 IN | DIASTOLIC BLOOD PRESSURE: 82 MMHG | OXYGEN SATURATION: 100 %

## 2024-07-27 PROCEDURE — 99284 EMERGENCY DEPT VISIT MOD MDM: CPT | Mod: 57

## 2024-07-27 PROCEDURE — 73630 X-RAY EXAM OF FOOT: CPT | Mod: 26,LT

## 2024-07-27 PROCEDURE — 29515 APPLICATION SHORT LEG SPLINT: CPT | Mod: LT

## 2024-07-27 PROCEDURE — 73090 X-RAY EXAM OF FOREARM: CPT | Mod: 26,LT

## 2024-07-27 PROCEDURE — 73630 X-RAY EXAM OF FOOT: CPT

## 2024-07-27 PROCEDURE — 73590 X-RAY EXAM OF LOWER LEG: CPT | Mod: 26,LT

## 2024-07-27 PROCEDURE — 73610 X-RAY EXAM OF ANKLE: CPT | Mod: 26,LT

## 2024-07-27 PROCEDURE — 73110 X-RAY EXAM OF WRIST: CPT

## 2024-07-27 PROCEDURE — 73110 X-RAY EXAM OF WRIST: CPT | Mod: 26,LT

## 2024-07-27 PROCEDURE — 73590 X-RAY EXAM OF LOWER LEG: CPT

## 2024-07-27 PROCEDURE — 27786 TREATMENT OF ANKLE FRACTURE: CPT | Mod: 54,LT

## 2024-07-27 PROCEDURE — 73080 X-RAY EXAM OF ELBOW: CPT | Mod: 26,LT

## 2024-07-27 PROCEDURE — 99284 EMERGENCY DEPT VISIT MOD MDM: CPT | Mod: 25

## 2024-07-27 PROCEDURE — 73080 X-RAY EXAM OF ELBOW: CPT

## 2024-07-27 PROCEDURE — 73090 X-RAY EXAM OF FOREARM: CPT

## 2024-07-27 PROCEDURE — 73610 X-RAY EXAM OF ANKLE: CPT

## 2024-07-27 RX ORDER — ACETAMINOPHEN 325 MG
975 TABLET ORAL ONCE
Refills: 0 | Status: COMPLETED | OUTPATIENT
Start: 2024-07-27 | End: 2024-07-27

## 2024-07-27 RX ADMIN — Medication 400 MILLIGRAM(S): at 11:01

## 2024-07-27 RX ADMIN — Medication 975 MILLIGRAM(S): at 11:01

## 2024-07-27 NOTE — ED PROVIDER NOTE - PATIENT PORTAL LINK FT
You can access the FollowMyHealth Patient Portal offered by [facility] by registering at the following website: [email or web address]. By joining [facility]’s FollowMyHealth portal, you will also be able to view your health information using other applications (apps) compatible with our system.

## 2024-07-27 NOTE — ED PROVIDER NOTE - CARE PROVIDER_API CALL
Oscar Villatoro  Foot and Ankle Surgery  [address], [place] [address]  Phone: [phone]  Fax: [phone]  Follow Up Time:     Doug Katz  Orthopaedic Surgery  [address], [address]  [place] [address]  Phone: [phone]  Fax: [phone]  Follow Up Time:

## 2024-07-27 NOTE — ED PROVIDER NOTE - CARE PLAN
Principal Discharge DX:	Fracture of toe of left foot  Secondary Diagnosis:	Closed fracture of left distal fibula  Secondary Diagnosis:	Left thumb sprain   1

## 2024-07-27 NOTE — ED ADULT NURSE NOTE - OBJECTIVE STATEMENT
49y Female AOx4 presents to the ED s/p mechanical fall. Pt report she missed a step and fell backwards down 3 steps. Endorses feeling "dazed", but denies head strike or LOC. Endorses pain to L foot and ankle. Denies numbness/tingling in L leg.  Denies N/V, fever/chills, SOB, chest pain. Spontaneous/unlabored respirations, speaking in full sentences. Side rails up, bed in lowest position, safety maintained.

## 2024-07-27 NOTE — ED PROVIDER NOTE - ATTENDING APP SHARED VISIT CONTRIBUTION OF CARE
Attending MD De Leon: I personally made/approved the management plan and take responsibility for the patient management.

## 2024-07-27 NOTE — ED PROVIDER NOTE - NSFOLLOWUPINSTRUCTIONS_ED_ALL_ED_FT
Please see the information of Avulsion Fracture of the Foot, splint care, and Walker instruction.    Elevate the affected limbs.    Keep the splint clean, dry, and intact.    Use walker as instructed.    Keep continue your current medications as prescribed.    Take Ibuprofen (400mg every 6-8hours with food) or Tylenol (2 tablets of 500mg every 8hours) as needed for pain.    Follow up with food Dr. Villatoro and hand specialist Dr. Lundberg for reevaluation, call Monday for appointment.    Return for any concerns, fever, numbness, weakness, or worsening pain.

## 2024-07-27 NOTE — ED PROVIDER NOTE - PROGRESS NOTE DETAILS
NAD. Awaiting for x-ray results. Attending MD De Leon: discussed with rads resident, he confirms that lateral cuboid with possible cortical disruption concerning for fracture, states attending read will be posted soon.

## 2024-07-27 NOTE — ED PROVIDER NOTE - CLINICAL SUMMARY MEDICAL DECISION MAKING FREE TEXT BOX
Attending MD De Leon:  49-year-old woman is presenting for evaluation of left foot and ankle pain as well as left wrist forearm and elbow pain after a fall.  Patient states that she had been up 3 steps on a staircase and missed a step falling backwards.  She felt a bit dazed but denies head injury or loss of consciousness.  Most of her pain is in the left foot and ankle, she has put a little bit of weight on it but with severe difficulty.  No numbness or tingling of the extremities since the fall.  Denies pain elsewhere.  Has not taken any medications as of yet for her pain.    Patient's vital signs are nonactionable.  The patient's head is atraumatic.  The cervical spine is nontender.  The chest wall is nontender.  Abdomen nontender bony pelvis is nontender.  Examination of left lower extremity: There is gross swelling of the dorsum of the foot and the lateral proximal aspect.  There is associated tenderness in this region.  There is gross swelling of the left lateral malleolus with associated diffuse tenderness as well.  The DP pulses palpable in bilateral feet sensation intact to light touch throughout patient is able to wiggle toes throughout.  The tib-fib is nontender on the left, the left knee is nontender.  Examination of the left upper extremity: No gross deformity swelling or focal bony tenderness of the left hand or wrist.  Radial pulses easily palpable in the left wrist.  The forearm is without focal bony tenderness, there is tenderness of the left posterior elbow, nontender otherwise.  The left upper arm and shoulder is without focal tenderness.  Normal affect moves all extremity spontaneously.  There is no snuffbox tenderness on the left.    Patient with fall backwards with left foot and ankle injury plan for x-rays of left foot and ankle as well as left tib-fib, will also obtain x-rays of the left wrist and forearm and elbow however fairly low suspicion for bony injury of these areas.  Patient without head or neck injury from the fall.          *The above represents an initial assessment/impression. Please refer to progress notes for potential changes in patient clinical course*

## 2024-07-27 NOTE — ED PROVIDER NOTE - WR INTERPRETATION 1
Osseous abnormality of the lateral cuboid that could be sesamoid bone however cannot exclude fracture.  No other obvious fracture of foot noted.

## 2024-07-27 NOTE — ED PROCEDURE NOTE - PROCEDURE ADDITIONAL DETAILS
Left hand: Thumb spica and Left foot/ankle: posterior splint applied.  A walker given with well demo.

## 2025-04-05 NOTE — ED PROVIDER NOTE - WR INTERPRETED BY 1
Goal Outcome Evaluation:      Plan of Care Reviewed With: patient, family    Overall Patient Progress: improvingOverall Patient Progress: improving    Outcome Evaluation: Lactic WNL, VSS, BP is o nthe softer side, Afebrile, IV's SL. Patient taking adequate fluids. Appetite is ok.    Patient more alert today, oriented. Reported feeling better. VSS. Afebrile. Skin is more dusky/lukas today. Family at bedside.     @ 1700 Patient up with daughter ambulating in hallway, reached nurses desk and reported feeling dizzy and light headed. Provided a WC for patient and daughter wheeled patient to family area.    Livan De Leon